# Patient Record
Sex: FEMALE | Race: BLACK OR AFRICAN AMERICAN
[De-identification: names, ages, dates, MRNs, and addresses within clinical notes are randomized per-mention and may not be internally consistent; named-entity substitution may affect disease eponyms.]

---

## 2017-02-23 ENCOUNTER — HOSPITAL ENCOUNTER (OUTPATIENT)
Dept: HOSPITAL 18 - NAVSJIPCSP | Age: 72
Discharge: HOME | End: 2017-02-23
Attending: INTERNAL MEDICINE
Payer: MEDICARE

## 2017-02-23 DIAGNOSIS — Z79.899: ICD-10-CM

## 2017-02-23 DIAGNOSIS — E78.5: Primary | ICD-10-CM

## 2017-02-23 DIAGNOSIS — E11.9: ICD-10-CM

## 2017-02-23 LAB — LDLC SERPL CALC-MCNC: 58 MG/DL

## 2017-02-23 PROCEDURE — 80061 LIPID PANEL: CPT

## 2017-02-23 PROCEDURE — 36415 COLL VENOUS BLD VENIPUNCTURE: CPT

## 2017-02-23 PROCEDURE — 83036 HEMOGLOBIN GLYCOSYLATED A1C: CPT

## 2017-05-25 ENCOUNTER — HOSPITAL ENCOUNTER (OUTPATIENT)
Dept: HOSPITAL 18 - NAVSJIPCSP | Age: 72
Discharge: HOME | End: 2017-05-25
Attending: INTERNAL MEDICINE
Payer: MEDICARE

## 2017-05-25 DIAGNOSIS — Z72.89: ICD-10-CM

## 2017-05-25 DIAGNOSIS — Z79.899: ICD-10-CM

## 2017-05-25 DIAGNOSIS — E11.9: Primary | ICD-10-CM

## 2017-05-25 LAB
CHD RISK SERPL-RTO: 2.2 (ref ?–4.5)
CHOLEST SERPL-MCNC: 139 MG/DL
HDLC SERPL-MCNC: 64 MG/DL
HEP C INDEX: 0.09 S/CO (ref 0–0.79)
LDLC SERPL CALC-MCNC: 66 MG/DL
TRIGL SERPL-MCNC: 45 MG/DL (ref ?–150)

## 2017-05-25 PROCEDURE — 83036 HEMOGLOBIN GLYCOSYLATED A1C: CPT

## 2017-05-25 PROCEDURE — 86803 HEPATITIS C AB TEST: CPT

## 2017-05-25 PROCEDURE — 80061 LIPID PANEL: CPT

## 2017-05-25 PROCEDURE — 36415 COLL VENOUS BLD VENIPUNCTURE: CPT

## 2017-06-13 ENCOUNTER — HOSPITAL ENCOUNTER (OUTPATIENT)
Dept: HOSPITAL 18 - NAV RAD | Age: 72
Discharge: HOME | End: 2017-06-13
Attending: INTERNAL MEDICINE
Payer: MEDICARE

## 2017-06-13 DIAGNOSIS — M25.552: Primary | ICD-10-CM

## 2017-06-13 DIAGNOSIS — M16.12: ICD-10-CM

## 2017-06-13 NOTE — RAD
TWO VIEWS LEFT HIP

6/13/2017

 

HISTORY:

Left hip pain radiating down the left leg for 2 weeks.

 

FINDINGS:

There is minimal left hip osteoarthritis.  No fracture or dislocation is seen.  There are prominent 
calcifications overlying the pelvis which could be related to calcified uterine fibroids, but this c
annot be further evaluated on this exam.

 

IMPRESSION:   

1. Large area of calcifications overlying the pelvis of which the exact etiology is uncertain, but t
his may be related to calcified uterine fibroids.  However, confirmation with CT scan examination ma
y be helpful.

 

2. Mild left hip osteoarthritis.

 

POS: AKIN

## 2017-09-05 ENCOUNTER — HOSPITAL ENCOUNTER (OUTPATIENT)
Dept: HOSPITAL 18 - NAVSJIPCSP | Age: 72
Discharge: HOME | End: 2017-09-05
Attending: INTERNAL MEDICINE
Payer: MEDICARE

## 2017-09-05 DIAGNOSIS — I11.9: ICD-10-CM

## 2017-09-05 DIAGNOSIS — Z79.899: ICD-10-CM

## 2017-09-05 DIAGNOSIS — E78.5: ICD-10-CM

## 2017-09-05 DIAGNOSIS — E11.9: Primary | ICD-10-CM

## 2017-09-05 DIAGNOSIS — R30.0: ICD-10-CM

## 2017-09-05 LAB
ALBUMIN SERPL BCG-MCNC: 3.8 G/DL (ref 3.4–4.8)
ALP SERPL-CCNC: 105 U/L (ref 40–150)
ALT SERPL W P-5'-P-CCNC: 10 U/L (ref 8–55)
ANION GAP SERPL CALC-SCNC: 16 MMOL/L (ref 10–20)
AST SERPL-CCNC: 15 U/L (ref 5–34)
BACTERIA UR QL AUTO: (no result) HPF
BASOPHILS # BLD AUTO: 0.1 THOU/UL (ref 0–0.2)
BASOPHILS NFR BLD AUTO: 0.9 % (ref 0–1)
BILIRUB SERPL-MCNC: 0.4 MG/DL (ref 0.2–1.2)
BUN SERPL-MCNC: 18 MG/DL (ref 9.8–20.1)
CALCIUM SERPL-MCNC: 10.1 MG/DL (ref 7.8–10.44)
CHD RISK SERPL-RTO: 2.3 (ref ?–4.5)
CHLORIDE SERPL-SCNC: 103 MMOL/L (ref 98–107)
CHOLEST SERPL-MCNC: 146 MG/DL
CO2 SERPL-SCNC: 26 MMOL/L (ref 23–31)
CREAT CL PREDICTED SERPL C-G-VRATE: 0 ML/MIN (ref 70–130)
EOSINOPHIL # BLD AUTO: 0.4 THOU/UL (ref 0–0.7)
EOSINOPHIL NFR BLD AUTO: 5.9 % (ref 0–10)
GLOBULIN SER CALC-MCNC: 2.9 G/DL (ref 2.4–3.5)
GLUCOSE SERPL-MCNC: 101 MG/DL (ref 83–110)
HDLC SERPL-MCNC: 64 MG/DL
HGB BLD-MCNC: 11.8 G/DL (ref 12–16)
LDLC SERPL CALC-MCNC: 72 MG/DL
LYMPHOCYTES # BLD AUTO: 2.1 THOU/UL (ref 1.2–3.4)
LYMPHOCYTES NFR BLD AUTO: 32.2 % (ref 21–51)
MCH RBC QN AUTO: 28.7 PG (ref 27–31)
MCV RBC AUTO: 90.2 FL (ref 81–99)
MONOCYTES # BLD AUTO: 0.5 THOU/UL (ref 0.11–0.59)
MONOCYTES NFR BLD AUTO: 7.1 % (ref 0–10)
NEUTROPHILS # BLD AUTO: 3.4 THOU/UL (ref 1.4–6.5)
NEUTROPHILS NFR BLD AUTO: 53.9 % (ref 42–75)
PLATELET # BLD AUTO: 298 THOU/UL (ref 130–400)
POTASSIUM SERPL-SCNC: 4 MMOL/L (ref 3.5–5.1)
RBC # BLD AUTO: 4.11 MILL/UL (ref 4.2–5.4)
RBC UR QL AUTO: (no result) HPF (ref 0–3)
SODIUM SERPL-SCNC: 141 MMOL/L (ref 136–145)
SP GR UR STRIP: 1.01 (ref 1–1.03)
TRIGL SERPL-MCNC: 50 MG/DL (ref ?–150)
WBC # BLD AUTO: 6.4 THOU/UL (ref 4.8–10.8)
WBC UR QL AUTO: (no result) HPF (ref 0–3)

## 2017-09-05 PROCEDURE — 80061 LIPID PANEL: CPT

## 2017-09-05 PROCEDURE — 87086 URINE CULTURE/COLONY COUNT: CPT

## 2017-09-05 PROCEDURE — 81015 MICROSCOPIC EXAM OF URINE: CPT

## 2017-09-05 PROCEDURE — 85025 COMPLETE CBC W/AUTO DIFF WBC: CPT

## 2017-09-05 PROCEDURE — 36415 COLL VENOUS BLD VENIPUNCTURE: CPT

## 2017-09-05 PROCEDURE — 87077 CULTURE AEROBIC IDENTIFY: CPT

## 2017-09-05 PROCEDURE — 83036 HEMOGLOBIN GLYCOSYLATED A1C: CPT

## 2017-09-05 PROCEDURE — 82043 UR ALBUMIN QUANTITATIVE: CPT

## 2017-09-05 PROCEDURE — 81003 URINALYSIS AUTO W/O SCOPE: CPT

## 2017-09-05 PROCEDURE — 87186 SC STD MICRODIL/AGAR DIL: CPT

## 2017-09-05 PROCEDURE — 80053 COMPREHEN METABOLIC PANEL: CPT

## 2018-06-21 ENCOUNTER — HOSPITAL ENCOUNTER (OUTPATIENT)
Dept: HOSPITAL 18 - NAV RAD | Age: 73
Discharge: HOME | End: 2018-06-21
Attending: INTERNAL MEDICINE
Payer: MEDICARE

## 2018-06-21 DIAGNOSIS — M17.12: Primary | ICD-10-CM

## 2018-06-21 NOTE — RAD
LEFT KNEE 4 VIEWS:

 

HISTORY:  

Left knee pain.

 

FINDINGS: 

There are degenerative changes in the left knee manifested by osteophyte formation and joint space na
rrowing.  No fracture, dislocation, or bony destruction is identified.

 

IMPRESSION: 

Left knee osteoarthritis.

 

POS: AKIN

## 2019-02-14 ENCOUNTER — HOSPITAL ENCOUNTER (OUTPATIENT)
Dept: HOSPITAL 92 - LABBT | Age: 74
Discharge: HOME | End: 2019-02-14
Attending: ORTHOPAEDIC SURGERY
Payer: MEDICARE

## 2019-02-14 DIAGNOSIS — Z01.818: Primary | ICD-10-CM

## 2019-02-14 DIAGNOSIS — M17.12: ICD-10-CM

## 2019-02-14 LAB
ANION GAP SERPL CALC-SCNC: 16 MMOL/L (ref 10–20)
BUN SERPL-MCNC: 19 MG/DL (ref 9.8–20.1)
CALCIUM SERPL-MCNC: 11.1 MG/DL (ref 7.8–10.44)
CHLORIDE SERPL-SCNC: 103 MMOL/L (ref 98–107)
CO2 SERPL-SCNC: 24 MMOL/L (ref 23–31)
CREAT CL PREDICTED SERPL C-G-VRATE: 0 ML/MIN (ref 70–130)
CRYSTAL-AUWI FLAG: 0.2 (ref 0–15)
GLUCOSE SERPL-MCNC: 83 MG/DL (ref 83–110)
HEV IGM SER QL: 0.7 (ref 0–7.99)
HGB BLD-MCNC: 13.2 G/DL (ref 12–16)
HYALINE CASTS #/AREA URNS LPF: (no result) LPF
INR PPP: 1.1
MCH RBC QN AUTO: 29.7 PG (ref 27–31)
MCV RBC AUTO: 93.2 FL (ref 78–98)
PATHC CAST-AUWI FLAG: 0 (ref 0–2.49)
PLATELET # BLD AUTO: 317 THOU/UL (ref 130–400)
POTASSIUM SERPL-SCNC: 3.9 MMOL/L (ref 3.5–5.1)
PROTHROMBIN TIME: 13.8 SEC (ref 12–14.7)
RBC # BLD AUTO: 4.44 MILL/UL (ref 4.2–5.4)
RBC UR QL AUTO: (no result) HPF (ref 0–3)
SODIUM SERPL-SCNC: 139 MMOL/L (ref 136–145)
SP GR UR STRIP: 1.01 (ref 1–1.04)
SPERM-AUWI FLAG: 0 (ref 0–9.9)
WBC # BLD AUTO: 7.2 THOU/UL (ref 4.8–10.8)
WBC UR QL AUTO: (no result) HPF (ref 0–3)
YEAST-AUWI FLAG: 0 (ref 0–25)

## 2019-02-14 PROCEDURE — 93005 ELECTROCARDIOGRAM TRACING: CPT

## 2019-02-14 PROCEDURE — 87081 CULTURE SCREEN ONLY: CPT

## 2019-02-14 PROCEDURE — 85027 COMPLETE CBC AUTOMATED: CPT

## 2019-02-14 PROCEDURE — 93010 ELECTROCARDIOGRAM REPORT: CPT

## 2019-02-14 PROCEDURE — 86901 BLOOD TYPING SEROLOGIC RH(D): CPT

## 2019-02-14 PROCEDURE — 86900 BLOOD TYPING SEROLOGIC ABO: CPT

## 2019-02-14 PROCEDURE — 85610 PROTHROMBIN TIME: CPT

## 2019-02-14 PROCEDURE — 81001 URINALYSIS AUTO W/SCOPE: CPT

## 2019-02-14 PROCEDURE — 80048 BASIC METABOLIC PNL TOTAL CA: CPT

## 2019-02-14 PROCEDURE — 86850 RBC ANTIBODY SCREEN: CPT

## 2019-02-19 ENCOUNTER — HOSPITAL ENCOUNTER (INPATIENT)
Dept: HOSPITAL 92 - SDC | Age: 74
LOS: 3 days | Discharge: NURSING, REHAB OR LONG-TERM CARE | DRG: 470 | End: 2019-02-22
Attending: ORTHOPAEDIC SURGERY | Admitting: ORTHOPAEDIC SURGERY
Payer: MEDICARE

## 2019-02-19 VITALS — BODY MASS INDEX: 31.8 KG/M2

## 2019-02-19 DIAGNOSIS — E11.22: ICD-10-CM

## 2019-02-19 DIAGNOSIS — E66.9: ICD-10-CM

## 2019-02-19 DIAGNOSIS — I12.9: ICD-10-CM

## 2019-02-19 DIAGNOSIS — E78.5: ICD-10-CM

## 2019-02-19 DIAGNOSIS — N18.3: ICD-10-CM

## 2019-02-19 DIAGNOSIS — M17.12: Primary | ICD-10-CM

## 2019-02-19 PROCEDURE — 36415 COLL VENOUS BLD VENIPUNCTURE: CPT

## 2019-02-19 PROCEDURE — 36416 COLLJ CAPILLARY BLOOD SPEC: CPT

## 2019-02-19 PROCEDURE — 85027 COMPLETE CBC AUTOMATED: CPT

## 2019-02-19 PROCEDURE — 0SRD0J9 REPLACEMENT OF LEFT KNEE JOINT WITH SYNTHETIC SUBSTITUTE, CEMENTED, OPEN APPROACH: ICD-10-PCS | Performed by: ORTHOPAEDIC SURGERY

## 2019-02-19 PROCEDURE — C1713 ANCHOR/SCREW BN/BN,TIS/BN: HCPCS

## 2019-02-19 PROCEDURE — C1776 JOINT DEVICE (IMPLANTABLE): HCPCS

## 2019-02-19 RX ADMIN — CEFAZOLIN SODIUM SCH MLS: 2 SOLUTION INTRAVENOUS at 13:57

## 2019-02-19 RX ADMIN — ASPIRIN SCH MG: 81 TABLET ORAL at 19:47

## 2019-02-19 RX ADMIN — CEFAZOLIN SODIUM SCH MLS: 2 SOLUTION INTRAVENOUS at 22:24

## 2019-02-19 NOTE — RAD
LEFT KNEE TWO VIEWS:

 

HISTORY:

Postop total knee.

 

COMPARISON:

Knee radiograph from 06/21/2018.

 

FINDINGS:

Satisfactory postop appearance, left total knee arthroplasty and patella resurfacing.  Expected posto
perative gas and edema.

 

IMPRESSION:

Satisfactory postoperative appearance.

 

POS: [facility]

## 2019-02-20 LAB
HGB BLD-MCNC: 11.8 G/DL (ref 12–16)
MCH RBC QN AUTO: 29.7 PG (ref 27–31)
MCV RBC AUTO: 92.9 FL (ref 78–98)
PLATELET # BLD AUTO: 280 THOU/UL (ref 130–400)
RBC # BLD AUTO: 3.96 MILL/UL (ref 4.2–5.4)
WBC # BLD AUTO: 12 THOU/UL (ref 4.8–10.8)

## 2019-02-20 RX ADMIN — ROPIVACAINE HYDROCHLORIDE SCH MLS: 2 INJECTION, SOLUTION EPIDURAL; INFILTRATION at 10:18

## 2019-02-20 RX ADMIN — DOCUSATE SODIUM 50 MG AND SENNOSIDES 8.6 MG SCH TAB: 8.6; 5 TABLET, FILM COATED ORAL at 10:09

## 2019-02-20 RX ADMIN — DOCUSATE SODIUM 50 MG AND SENNOSIDES 8.6 MG SCH TAB: 8.6; 5 TABLET, FILM COATED ORAL at 22:07

## 2019-02-20 RX ADMIN — AMLODIPINE BESYLATE AND BENAZEPRIL HYDROCHLORIDE SCH CAP: 5; 20 CAPSULE ORAL at 21:34

## 2019-02-20 RX ADMIN — ASPIRIN SCH MG: 81 TABLET ORAL at 11:14

## 2019-02-20 RX ADMIN — MULTIPLE VITAMINS W/ MINERALS TAB SCH TAB: TAB at 10:10

## 2019-02-20 RX ADMIN — ASPIRIN SCH MG: 81 TABLET ORAL at 21:35

## 2019-02-20 NOTE — PDOC.PN
- Subjective


Encounter Start Date: 02/20/19


Encounter Start Time: 18:30





Patient seen and examined for med mngt. Pain better controlled with PCA. No CP/

SOB. No new complaints. No overnight events





- Objective


MAR Reviewed: Yes


Vital Signs & Weight: 


 Vital Signs (12 hours)











  Temp Pulse Resp BP BP Pulse Ox


 


 02/20/19 21:35   102 H   121/70  


 


 02/20/19 20:00  98.8 F  101 H  20   121/70  94 L


 


 02/20/19 16:18  98.1 F  92  18   103/65  93 L


 


 02/20/19 14:43     145/81 H  


 


 02/20/19 12:25  97.6 F  86  18   104/64  96


 


 02/20/19 10:12   89   162/78 H  


 


 02/20/19 10:10   89   162/78 H  








 Weight











Admit Weight                   197 lb


 


Weight                         197 lb














I&O: 


 











 02/19/19 02/20/19 02/21/19





 06:59 06:59 06:59


 


Intake Total  885 1800


 


Output Total  750 


 


Balance  135 1800











Result Diagrams: 


 02/21/19 05:20





Additional Labs: 


 Accuchecks











  02/20/19 02/20/19 02/20/19





  16:16 11:16 05:36


 


POC Glucose  107  130 H  121 H














  02/19/19





  21:12


 


POC Glucose  131 H














Phys Exam





- Physical Examination


Constitutional: NAD


Respiratory: no wheezing, no rhonchi


Cardiovascular: RRR, no rub


Gastrointestinal: soft, non-tender, positive bowel sounds


Musculoskeletal: no edema





Dx/Plan





- Plan


DVT proph w/SCDs





1. HTN - better controlled


2. HLD


3. DM2


4. CKD3


5. Obesity BMI 31.8





PLAN:


Cont Clonidine


Will hold Hydralazine, Coreg and Lotrel if SBP <130-140


Cont low dose sliding scale


Cont to monitor











Review of Systems





- Review of Systems


Cardiovascular: negative: chest pain, palpitations, orthopnea, paroxysmal 

nocturnal dyspnea, edema, light headedness, other


Gastrointestinal: negative: Nausea, Vomiting, Abdominal Pain, Diarrhea, 

Constipation, Melena, Hematochezia, Other





- Medications/Allergies


Allergies/Adverse Reactions: 


 Allergies











Allergy/AdvReac Type Severity Reaction Status Date / Time


 


codeine Allergy   Verified 02/14/19 12:48











Medications: 


 Current Medications





Acetaminophen (Tylenol)  650 mg PO Q4H PRN


   PRN Reason: Headache/Fever or Pain


   Last Admin: 02/19/19 19:49 Dose:  650 mg


Amlodipine/Benazepril HCl (Lotrel 5/20)  1 cap PO BID [facility]


   Last Admin: 02/20/19 21:34 Dose:  1 cap


Aspirin (Ecotrin)  81 mg PO BID [facility]


   Last Admin: 02/20/19 21:35 Dose:  81 mg


Atorvastatin Calcium (Lipitor)  10 mg PO HS [facility]


   Last Admin: 02/20/19 21:34 Dose:  10 mg


Carvedilol (Coreg)  6.25 mg PO BID [facility]


   Last Admin: 02/20/19 21:35 Dose:  6.25 mg


Clonidine (Catapres)  0.2 mg PO TID [facility]


   Last Admin: 02/20/19 21:35 Dose:  0.2 mg


Dextrose/Water (Dextrose 50%)  25 gm SLOW IVP PRN PRN


   PRN Reason: Hypoglycemia


Diphenhydramine HCl (Benadryl)  25 mg IVP Q3H PRN


   PRN Reason: Itching


Diphenhydramine HCl (Benadryl)  25 mg PO Q3H PRN


   PRN Reason: Itching


Diphenhydramine HCl (Benadryl)  25 mg IM Q3H PRN


   PRN Reason: Itching


Ferrous Gluconate (Fergon)  324 mg PO BID-[facility]


   Last Admin: 02/20/19 16:54 Dose:  324 mg


Glucagon (Glucagon)  1 mg IM PRN PRN


   PRN Reason: Hypoglycemia


Hydralazine HCl (Apresoline)  10 mg SLOW IVP Q4H PRN


   PRN Reason: SBP Greater Than 180


Hydralazine HCl (Apresoline)  25 mg PO BID [facility]


   Last Admin: 02/20/19 21:35 Dose:  25 mg


Hydrochlorothiazide (Hydrochlorothiazide)  25 mg PO DAILY [facility]


Ropivacaine 250 ml/ Device  250 mls @ 0 mls/hr NERVE BLCK INF [facility]


   Last Admin: 02/20/19 10:18 Dose:  250 mls


Sodium Chloride (Normal Saline 0.9%)  1,000 mls @ 100 mls/hr IV .Q10H [facility]


   Last Admin: 02/20/19 14:28 Dose:  Not Given


Fentanyl Citrate 2,000 mcg/ (Sodium Chloride)  100 mls @ 0 mls/hr IV INF PRN


   PRN Reason: Pain


   Last Admin: 02/19/19 21:45 Dose:  100 mls


Dextrose/Water (D5w)  1,000 mls @ 0 mls/hr IV .Q0M PRN


   PRN Reason: Hypoglycemia


Iron/Minerals/Multivitamins (Theragran M)  1 tab PO DAILY [facility]


   Last Admin: 02/20/19 10:10 Dose:  1 tab


Ketorolac Tromethamine (Toradol)  15 mg IVP Q6H PRN


   PRN Reason: Moderate Pain (4-6)


   Stop: 02/22/19 21:19


   Last Admin: 02/20/19 10:08 Dose:  15 mg


Loratadine (Claritin)  10 mg PO DAILY [facility]


   Last Admin: 02/20/19 10:10 Dose:  10 mg


Metformin HCl (Glucophage)  500 mg PO QA-[facility]


   Last Admin: 02/20/19 10:09 Dose:  500 mg


Naloxone HCl (Narcan)  0.2 mg IV Q5MIN PRN


   PRN Reason: Opiate Reversal


Ondansetron HCl (Zofran)  4 mg IVP Q6H PRN


   PRN Reason: Nausea/Vomiting


Promethazine HCl (Phenergan)  12.5 mg IM Q4H PRN


   PRN Reason: Nausea/Vomiting


Senna/Docusate Sodium (Senokot S)  2 tab PO BID [facility]


   Last Admin: 02/20/19 10:09 Dose:  2 tab


Sodium Chloride (Flush - Normal Saline)  10 ml IVF PRN PRN


   PRN Reason: Saline Flush


Zolpidem Tartrate (Ambien)  5 mg PO HSPRN PRN


   PRN Reason: Insomnia

## 2019-02-21 LAB
HGB BLD-MCNC: 10.4 G/DL (ref 12–16)
MCH RBC QN AUTO: 30.1 PG (ref 27–31)
MCV RBC AUTO: 93.8 FL (ref 78–98)
PLATELET # BLD AUTO: 218 THOU/UL (ref 130–400)
RBC # BLD AUTO: 3.45 MILL/UL (ref 4.2–5.4)
WBC # BLD AUTO: 9.3 THOU/UL (ref 4.8–10.8)

## 2019-02-21 RX ADMIN — AMLODIPINE BESYLATE AND BENAZEPRIL HYDROCHLORIDE SCH: 5; 20 CAPSULE ORAL at 21:00

## 2019-02-21 RX ADMIN — DOCUSATE SODIUM 50 MG AND SENNOSIDES 8.6 MG SCH TAB: 8.6; 5 TABLET, FILM COATED ORAL at 09:06

## 2019-02-21 RX ADMIN — MULTIPLE VITAMINS W/ MINERALS TAB SCH TAB: TAB at 09:06

## 2019-02-21 RX ADMIN — ASPIRIN SCH MG: 81 TABLET ORAL at 21:01

## 2019-02-21 RX ADMIN — ASPIRIN SCH MG: 81 TABLET ORAL at 09:06

## 2019-02-21 RX ADMIN — DOCUSATE SODIUM 50 MG AND SENNOSIDES 8.6 MG SCH TAB: 8.6; 5 TABLET, FILM COATED ORAL at 21:00

## 2019-02-21 RX ADMIN — HYDROCODONE BITARTRATE AND ACETAMINOPHEN PRN TAB: 5; 325 TABLET ORAL at 21:05

## 2019-02-21 RX ADMIN — AMLODIPINE BESYLATE AND BENAZEPRIL HYDROCHLORIDE SCH: 5; 20 CAPSULE ORAL at 10:19

## 2019-02-21 RX ADMIN — HYDROCODONE BITARTRATE AND ACETAMINOPHEN PRN TAB: 5; 325 TABLET ORAL at 11:29

## 2019-02-21 RX ADMIN — HYDROCODONE BITARTRATE AND ACETAMINOPHEN PRN TAB: 5; 325 TABLET ORAL at 15:33

## 2019-02-21 RX ADMIN — ROPIVACAINE HYDROCHLORIDE SCH MLS: 2 INJECTION, SOLUTION EPIDURAL; INFILTRATION at 12:21

## 2019-02-21 NOTE — PDOC.PN
- Subjective


Encounter Start Date: 02/21/19


Encounter Start Time: 10:30





Patient seen and examined for med mngt. Pain controlled. No CP/SOB/

Palpitations. No new complaints. No overnight events





- Objective


MAR Reviewed: Yes


Vital Signs & Weight: 


 Vital Signs (12 hours)











  Temp Pulse Resp BP BP Pulse Ox


 


 02/21/19 17:05   92    112/70 


 


 02/21/19 15:15  98.4 F  109 H  16   147/59 H  97


 


 02/21/19 15:02     148/78 H  


 


 02/21/19 11:58  98.4 F  101 H  18   149/80 H  97


 


 02/21/19 08:39       95


 


 02/21/19 07:30  98.8 F  88  16   109/65  95








 Weight











Admit Weight                   197 lb


 


Weight                         197 lb














I&O: 


 











 02/20/19 02/21/19 02/22/19





 06:59 06:59 06:59


 


Intake Total 885 1800 


 


Output Total 750  


 


Balance 135 1800 











Result Diagrams: 


 02/21/19 05:20








Phys Exam





- Physical Examination


Constitutional: NAD


Respiratory: no wheezing, no rhonchi


Cardiovascular: RRR, no rub


Gastrointestinal: soft, non-tender, positive bowel sounds


Musculoskeletal: no edema





Dx/Plan





- Plan


DVT proph w/SCDs





1. HTN


2. HLD


3. DM2


4. CKD3


5. Obesity BMI 31.8





PLAN:


Cont Clonidine, Hydralazine, Coreg and Lotrel with holding parameters


dc sliding scale


Cont to monitor


Cont Metformin








Review of Systems





- Review of Systems


Respiratory: negative: Cough, Dry, Shortness of Breath, Hemoptysis, SOB with 

Excertion, Pleuritic Pain, Sputum, Wheezing


Cardiovascular: negative: chest pain, palpitations, orthopnea, paroxysmal 

nocturnal dyspnea, edema, light headedness, other





- Medications/Allergies


Allergies/Adverse Reactions: 


 Allergies











Allergy/AdvReac Type Severity Reaction Status Date / Time


 


codeine Allergy   Verified 02/14/19 12:48











Medications: 


 Current Medications





Acetaminophen (Tylenol)  650 mg PO Q4H PRN


   PRN Reason: Headache/Fever or Pain


   Last Admin: 02/21/19 03:56 Dose:  650 mg


Hydrocodone Bitart/Acetaminophen (Norco 5/325)  1 tab PO Q4H PRN


   PRN Reason: Mild Pain (1-3)


Hydrocodone Bitart/Acetaminophen (Norco 5/325)  2 tab PO Q4H PRN


   PRN Reason: Mild-Moderate Pain (1-5)


   Last Admin: 02/21/19 15:33 Dose:  2 tab


Amlodipine/Benazepril HCl (Lotrel 5/20)  1 cap PO BID [facility]


   Last Admin: 02/21/19 10:19 Dose:  Not Given


Aspirin (Ecotrin)  81 mg PO BID [facility]


   Last Admin: 02/21/19 09:06 Dose:  81 mg


Atorvastatin Calcium (Lipitor)  10 mg PO HS [facility]


   Last Admin: 02/20/19 21:34 Dose:  10 mg


Carvedilol (Coreg)  6.25 mg PO BID [facility]


   Last Admin: 02/21/19 10:19 Dose:  Not Given


Clonidine (Catapres)  0.2 mg PO TID [facility]


   Last Admin: 02/21/19 15:02 Dose:  0.2 mg


Dextrose/Water (Dextrose 50%)  25 gm SLOW IVP PRN PRN


   PRN Reason: Hypoglycemia


Diphenhydramine HCl (Benadryl)  25 mg IVP Q3H PRN


   PRN Reason: Itching


Diphenhydramine HCl (Benadryl)  25 mg PO Q3H PRN


   PRN Reason: Itching


Diphenhydramine HCl (Benadryl)  25 mg IM Q3H PRN


   PRN Reason: Itching


Ferrous Gluconate (Fergon)  324 mg PO BID-[facility]


   Last Admin: 02/21/19 17:05 Dose:  324 mg


Glucagon (Glucagon)  1 mg IM PRN PRN


   PRN Reason: Hypoglycemia


Hydralazine HCl (Apresoline)  10 mg SLOW IVP Q4H PRN


   PRN Reason: SBP Greater Than 180


Hydralazine HCl (Apresoline)  25 mg PO BID [facility]


   Last Admin: 02/21/19 10:20 Dose:  Not Given


Hydrochlorothiazide (Hydrochlorothiazide)  25 mg PO DAILY [facility]


   Last Admin: 02/21/19 10:20 Dose:  Not Given


Ropivacaine 250 ml/ Device  250 mls @ 0 mls/hr NERVE BLCK INF [facility]


   Last Admin: 02/21/19 12:21 Dose:  250 mls


Dextrose/Water (D5w)  1,000 mls @ 0 mls/hr IV .Q0M PRN


   PRN Reason: Hypoglycemia


Iron/Minerals/Multivitamins (Theragran M)  1 tab PO DAILY [facility]


   Last Admin: 02/21/19 09:06 Dose:  1 tab


Ketorolac Tromethamine (Toradol)  15 mg IVP Q6H PRN


   PRN Reason: Moderate Pain (4-6)


   Stop: 02/22/19 21:19


   Last Admin: 02/20/19 10:08 Dose:  15 mg


Loratadine (Claritin)  10 mg PO DAILY [facility]


   Last Admin: 02/21/19 09:06 Dose:  10 mg


Metformin HCl (Glucophage)  500 mg PO QA-[facility]


   Last Admin: 02/21/19 09:06 Dose:  500 mg


Naloxone HCl (Narcan)  0.2 mg IV Q5MIN PRN


   PRN Reason: Opiate Reversal


Ondansetron HCl (Zofran)  4 mg IVP Q6H PRN


   PRN Reason: Nausea/Vomiting


Promethazine HCl (Phenergan)  12.5 mg IM Q4H PRN


   PRN Reason: Nausea/Vomiting


Senna/Docusate Sodium (Senokot S)  2 tab PO BID [facility]


   Last Admin: 02/21/19 09:06 Dose:  2 tab


Sodium Chloride (Flush - Normal Saline)  10 ml IVF PRN PRN


   PRN Reason: Saline Flush


Zolpidem Tartrate (Ambien)  5 mg PO HSPRN PRN


   PRN Reason: Insomnia

## 2019-02-22 ENCOUNTER — HOSPITAL ENCOUNTER (INPATIENT)
Dept: HOSPITAL 18 - NAV ACUTE | Age: 74
LOS: 14 days | Discharge: HOME HEALTH | DRG: 561 | End: 2019-03-08
Attending: INTERNAL MEDICINE | Admitting: INTERNAL MEDICINE
Payer: MEDICARE

## 2019-02-22 VITALS — SYSTOLIC BLOOD PRESSURE: 145 MMHG | DIASTOLIC BLOOD PRESSURE: 79 MMHG | TEMPERATURE: 100 F

## 2019-02-22 VITALS — BODY MASS INDEX: 32.3 KG/M2

## 2019-02-22 DIAGNOSIS — K59.00: ICD-10-CM

## 2019-02-22 DIAGNOSIS — M48.061: ICD-10-CM

## 2019-02-22 DIAGNOSIS — E78.5: ICD-10-CM

## 2019-02-22 DIAGNOSIS — E11.22: ICD-10-CM

## 2019-02-22 DIAGNOSIS — J30.9: ICD-10-CM

## 2019-02-22 DIAGNOSIS — Z79.84: ICD-10-CM

## 2019-02-22 DIAGNOSIS — N18.3: ICD-10-CM

## 2019-02-22 DIAGNOSIS — I12.9: ICD-10-CM

## 2019-02-22 DIAGNOSIS — Z88.8: ICD-10-CM

## 2019-02-22 DIAGNOSIS — Z47.1: Primary | ICD-10-CM

## 2019-02-22 DIAGNOSIS — Z96.652: ICD-10-CM

## 2019-02-22 DIAGNOSIS — E66.9: ICD-10-CM

## 2019-02-22 DIAGNOSIS — R53.81: ICD-10-CM

## 2019-02-22 DIAGNOSIS — G89.29: ICD-10-CM

## 2019-02-22 DIAGNOSIS — Z98.51: ICD-10-CM

## 2019-02-22 LAB
HGB BLD-MCNC: 11.1 G/DL (ref 12–16)
MCH RBC QN AUTO: 30.5 PG (ref 27–31)
MCV RBC AUTO: 93.7 FL (ref 78–98)
PLATELET # BLD AUTO: 282 THOU/UL (ref 130–400)
RBC # BLD AUTO: 3.62 MILL/UL (ref 4.2–5.4)
WBC # BLD AUTO: 10.3 THOU/UL (ref 4.8–10.8)

## 2019-02-22 PROCEDURE — 36416 COLLJ CAPILLARY BLOOD SPEC: CPT

## 2019-02-22 PROCEDURE — 80048 BASIC METABOLIC PNL TOTAL CA: CPT

## 2019-02-22 PROCEDURE — 85025 COMPLETE CBC W/AUTO DIFF WBC: CPT

## 2019-02-22 RX ADMIN — DOCUSATE SODIUM 50 MG AND SENNOSIDES 8.6 MG SCH TAB: 8.6; 5 TABLET, FILM COATED ORAL at 09:02

## 2019-02-22 RX ADMIN — AMLODIPINE BESYLATE AND BENAZEPRIL HYDROCHLORIDE SCH CAP: 5; 20 CAPSULE ORAL at 09:02

## 2019-02-22 RX ADMIN — MULTIPLE VITAMINS W/ MINERALS TAB SCH TAB: TAB at 09:02

## 2019-02-22 RX ADMIN — ASPIRIN SCH MG: 81 TABLET ORAL at 09:02

## 2019-02-23 LAB
ANION GAP SERPL CALC-SCNC: 11 MMOL/L (ref 10–20)
BASOPHILS # BLD AUTO: 0.2 THOU/UL (ref 0–0.2)
BASOPHILS NFR BLD AUTO: 1.6 % (ref 0–1)
BUN SERPL-MCNC: 13 MG/DL (ref 9.8–20.1)
CALCIUM SERPL-MCNC: 9.8 MG/DL (ref 7.8–10.44)
CHLORIDE SERPL-SCNC: 105 MMOL/L (ref 98–107)
CO2 SERPL-SCNC: 26 MMOL/L (ref 23–31)
CREAT CL PREDICTED SERPL C-G-VRATE: 78 ML/MIN (ref 70–130)
EOSINOPHIL # BLD AUTO: 0.5 THOU/UL (ref 0–0.7)
EOSINOPHIL NFR BLD AUTO: 4.9 % (ref 0–10)
GLUCOSE SERPL-MCNC: 121 MG/DL (ref 83–110)
HGB BLD-MCNC: 9.3 G/DL (ref 12–16)
LYMPHOCYTES # BLD AUTO: 1.6 THOU/UL (ref 1.2–3.4)
LYMPHOCYTES NFR BLD AUTO: 16.1 % (ref 21–51)
MCH RBC QN AUTO: 29.4 PG (ref 27–31)
MCV RBC AUTO: 90.5 FL (ref 78–98)
MONOCYTES # BLD AUTO: 1.1 THOU/UL (ref 0.11–0.59)
MONOCYTES NFR BLD AUTO: 11.2 % (ref 0–10)
NEUTROPHILS # BLD AUTO: 6.4 THOU/UL (ref 1.4–6.5)
NEUTROPHILS NFR BLD AUTO: 66.3 % (ref 42–75)
PLATELET # BLD AUTO: 314 THOU/UL (ref 130–400)
POTASSIUM SERPL-SCNC: 3.7 MMOL/L (ref 3.5–5.1)
RBC # BLD AUTO: 3.16 MILL/UL (ref 4.2–5.4)
SODIUM SERPL-SCNC: 138 MMOL/L (ref 136–145)
WBC # BLD AUTO: 9.7 THOU/UL (ref 4.8–10.8)

## 2019-02-23 RX ADMIN — ASPIRIN SCH MG: 81 TABLET ORAL at 20:33

## 2019-02-23 NOTE — HP
CHIEF COMPLAINT:  Status post left total knee replacement for therapy.



BRIEF HISTORY:  This is a very pleasant 73-year-old overweight 

female who underwent elective left total knee replacement.  She was seen by

hospitalist for medical management.  She has improved sufficiently that they felt

her to be a candidate for inpatient rehabilitation and transferred her here.  The

patient is resting comfortably in bed and denies any concerns.  Her family is in the

room.  She denies any fever or chills.  She states that the pain medicines are

helping.  No other complaints. 



PAST MEDICAL HISTORY:  

1. Diabetes mellitus, type 2.

2. Hypertension.

3. Dyslipidemia.

4. Chronic kidney disease, stage 3.

5. Obesity with a BMI of 31.8.

6. She also has history of chronic back pain and degenerative joint disease.



PAST SURGICAL HISTORY:  

1. She has had epidural steroid injections in the past.

2. Bilateral tubal ligations.

3. Recent left total knee replacement.



FAMILY HISTORY:  Positive for hypertension in her father and mother.  One sister

 of coronary artery disease and MI. 



PSYCHOSOCIAL HISTORY:  Denies any tobacco, alcohol, or recreational drug use.

Fairly active and independent. 



ALLERGIES:  TO TYLENOL NO. 3.



MEDICATIONS:  She has been transferred here on the following medications:

1. Amlodipine 10 mg p.o. at bedtime.

2. Ecotrin 81 mg p.o. b.i.d.

3. Lipitor 10 mg daily.

4. Lotensin 40 mg daily.

5. Carvedilol 6.25 mg b.i.d.

6. Clonidine 0.2 mg t.i.d.

7. Hydralazine 25 mg daily.

8. Hydrochlorothiazide 25 mg daily.

9. Claritin 10 mg daily.

10. Metformin 500 mg daily.

11. Tramadol 50 mg q.6 p.r.n.



REVIEW OF SYSTEMS:  CARDIOVASCULAR SYSTEMS:  Denies any chest pain, shortness of

breath, palpitations, PND, orthopnea, or pedal edema. 

RESPIRATORY SYSTEM:  Denies any chronic cough, expectoration, or pleuritic type

chest pain. 

GASTROINTESTINAL SYSTEM:  Denies any nausea, vomiting, diarrhea, constipation,

hematemesis, melena, or hematochezia. 

GENITOURINARY SYSTEM:  Denies any frequency, urgency, dysuria, or hematuria. 

CENTRAL NERVOUS SYSTEM:  Denies any focal numbness, weakness, or fainting spells. 

HEENT:  Denies any difficulty with vision, speech, hearing, or swallowing.



PHYSICAL EXAMINATION:

GENERAL:  Very pleasant 73-year-old  female who is resting

comfortably in bed, in no apparent distress.  She responds appropriate to questions.

 She is alert, awake, and oriented x3. 

VITAL SIGNS:  She is afebrile, heart rate 91, respirations 16, oxygen saturation 99%

on room air, and blood pressure was 165/80 this morning.  This is prior to her blood

pressure medications. 

HEENT:  Normocephalic, atraumatic.  Pupils equal, reactive to light and

accommodation.  No JVD, thyromegaly, cervical lymphadenopathy __________ carotid

bruits 

CARDIOVASCULAR SYSTEM:  S1 and S2 plus.  Rate and rhythm regular. 

RESPIRATORY SYSTEM:  Normal vesicular breath sounds heard on all lung fields. 

ABDOMEN:  Soft, obese, nontender.  Bowel sounds heard in all quadrants. 

EXTREMITIES:  Without cyanosis or clubbing.  Trace edema, left leg.  Left knee

incision with dressing.  Localized area of superficial erythema away from the

incision in her medial aspect of her left leg.  No warmth or tenderness. 

CENTRAL NERVOUS SYSTEM:  Alert, awake, and oriented x3.  Cranial nerves 2 through 12

grossly intact.  Motor system examination is grossly nonfocal except for weakness in

the left leg probably due to the need for surgery. 



LABORATORY VALUES:  White count is 9.7, H and H are 9.3 and 28.6; these were 11.8

and 36.8 prior to surgery.  Sodium 138, potassium 3.7, and BUN and creatinine 13 and

0.92.  Blood sugars are 121, 130, 107, 121, and 125. 



IMPRESSION:  

1. Degenerative joint disease, status post left total knee replacement.

2. Obesity with BMI of 31.8.

3. Diabetes mellitus, type 2, well controlled.

4. Hypertension, not well controlled.

5. Dyslipidemia.

6. Allergic rhinitis.

7. History of lumbar spinal stenosis, requiring epidural steroid injections.



PLAN:  

1. Continue current medications.

2. Change aspirin to b.i.d. as per my discussion with the PA for Orthopedic Surgery.

3. 1800-calorie heart healthy ADA diet.

4. Accu-Cheks with sliding scale coverage.

5. Monitor blood pressure and adjust medications as needed.

6. Incision care.

7. Orthopedic precautions.

8. Watch for any signs of infection.

9. Ice pack to left knee.

10. PT and OT eval and treat.

11. Routine laboratory values.

12. Discussed with the patient and family in detail and all questions answered.







Job ID:  [id number]

## 2019-02-24 RX ADMIN — ASPIRIN SCH MG: 81 TABLET ORAL at 10:03

## 2019-02-24 RX ADMIN — ASPIRIN SCH MG: 81 TABLET ORAL at 20:33

## 2019-02-24 NOTE — PRG
DATE OF SERVICE:  02/24/2019



SUBJECTIVE:  Ms. Green is resting comfortably.  Denies any complaints.  Tolerating

therapy.  Medications are helping with her pain.  Denies any chest pain, shortness

of breath, lightheadedness, or dizziness. 



OBJECTIVE:  VITAL SIGNS:  She is afebrile.  Heart rate 89, respirations 20, oxygen

saturation 93%, and blood pressure 148/68. 

CARDIOVASCULAR:  S1 and S2 plus. 

RESPIRATORY:  Normal vesicular breath sounds. 

ABDOMEN:  Soft, obese, nontender.  Bowel sounds in all quadrants. 

EXTREMITIES:  Without cyanosis or clubbing.  Trace edema, left leg.  Left knee

incision with dressing.  Superficial erythema noted yesterday has resolved. 

CENTRAL NERVOUS SYSTEM:  Awake and oriented x3.  Cranial nerves 2 through 12 intact.

 Generalized weakness. 



LABORATORY DATA:  Blood sugars are 141, 205, 127 and 116.



IMPRESSION:  

1. Left total knee replacement.

2. Diabetes mellitus type 2.

3. Hypertension, well controlled.

4. Dyslipidemia.

5. Degenerative joint disease.

6. Obesity.

7. Chronic low back pain.



PLAN:  

1. Continue current medications.

2. Heart healthy diet.

3. Monitor blood pressure and adjust medications as needed.

4. Incision care.

5. Orthopedic precautions.

6. DVT and stress ulcer prophylaxis.

7. Decubitus precautions.

8. Routine laboratory values.

9. Continue physical therapy.

10. Discussed with the patient in detail and all questions answered.







Job ID:  [id number]

## 2019-02-25 RX ADMIN — ASPIRIN SCH MG: 81 TABLET ORAL at 08:28

## 2019-02-25 RX ADMIN — ASPIRIN SCH MG: 81 TABLET ORAL at 19:45

## 2019-02-25 NOTE — PRG
DATE OF SERVICE:  02/25/2019



SUBJECTIVE:  Ms. Green is up in the wheelchair and eating lunch.  Her family is

with her.  She apparently had a good session of therapy this morning.  She denies

any fever or chills.  She does notice some pain in her surgical site. 



OBJECTIVE:  VITAL SIGNS:  She is afebrile, heart rate 86, respirations 18, oxygen

saturation is 96% on room air, and blood pressure is 144/79. 

CARDIOVASCULAR SYSTEM:  S1 and S2 plus. 

RESPIRATORY SYSTEM:  Normal vesicular breath sounds. 

ABDOMEN:  Soft, obese, nontender.  Bowel sounds in all quadrants. 

EXTREMITIES:  Without cyanosis or clubbing.  Trace edema, left leg.  Left knee

incision looks great.  No neurovascular compromise. 

CENTRAL NERVOUS SYSTEM:  A, A, and O x3.  Cranial nerves 2 through 12 intact.

Generalized weakness. 



LABORATORY DATA:  Blood sugars are 127, 116, 118, 152, 118, and 115.



IMPRESSION:  

1. Diabetes mellitus, type 2, well controlled.

2. Hypertension, well controlled.

3. Dyslipidemia.

4. Degenerative joint disease, status post left total knee replacement.

5. Chronic back pain due to spinal stenosis.



PLAN:  

1. Continue current medications.

2. 1800-calorie heart healthy ADA diet.

3. Accu-Cheks with sliding scale coverage.

4. Monitor blood pressure and adjust medications as needed.

5. Incision care.

6. Orthopedic precautions.

7. Routine laboratory values.

8. DVT and stress ulcer prophylaxis.

9. Discussed with the patient and family in detail and all questions answered.







Job ID:  [id number]

## 2019-02-26 RX ADMIN — ASPIRIN SCH MG: 81 TABLET ORAL at 09:21

## 2019-02-26 RX ADMIN — HYDROCODONE BITARTRATE AND ACETAMINOPHEN PRN TAB: 5; 325 TABLET ORAL at 21:21

## 2019-02-26 RX ADMIN — ASPIRIN SCH MG: 81 TABLET ORAL at 21:23

## 2019-02-27 RX ADMIN — HYDROCODONE BITARTRATE AND ACETAMINOPHEN PRN TAB: 5; 325 TABLET ORAL at 15:06

## 2019-02-27 RX ADMIN — ASPIRIN SCH MG: 81 TABLET ORAL at 08:47

## 2019-02-27 RX ADMIN — HYDROCODONE BITARTRATE AND ACETAMINOPHEN PRN TAB: 5; 325 TABLET ORAL at 21:05

## 2019-02-27 RX ADMIN — ASPIRIN SCH MG: 81 TABLET ORAL at 20:50

## 2019-02-27 RX ADMIN — HYDROCODONE BITARTRATE AND ACETAMINOPHEN PRN TAB: 5; 325 TABLET ORAL at 08:47

## 2019-02-27 NOTE — PRG
DATE OF SERVICE:  02/27/2019



SUBJECTIVE:  Ms. Green is doing well.  She did require increase in her pain

medicine to Norco from the tramadol and that really seems to be helping.  She is

tolerating her therapy.  Her leg swelling is much improved.  No redness.  The

patient's niece in the room.  No concerns or questions. 



OBJECTIVE:  VITAL SIGNS:  She is afebrile.  Heart rate 88, respirations 18, oxygen

saturation 100% on room air, blood pressure 129/62. 

CARDIOVASCULAR SYSTEM:  S1-S2 plus. 

RESPIRATORY SYSTEM:  Normal vesicular breath sounds heard in all lung fields. 

ABDOMEN:  Soft, obese, nontender.  Bowel sounds heard in all quadrants. 

EXTREMITIES:  Without cyanosis or clubbing.  Left knee incision is healthy.

Improved edema. 

CENTRAL NERVOUS SYSTEM:  Alert, awake, and oriented x3.  Cranial nerves 2 through 12

intact. 



LABORATORY DATA:  Blood sugars are 112, 166, 133, 140, 123.



IMPRESSION:  

1. Diabetes mellitus type 2, well controlled.

2. Hypertension.

3. Dyslipidemia.

4. Degenerative joint disease, status post left total knee replacement.

5. Lumbar spinal stenosis.

6. Obesity.

7. Improving deconditioning.



PLAN:  

1. Continue current medications.

2. 1800-calorie heart healthy ADA diet.

3. Accu-Cheks with sliding scale coverage.

4. DVT and stress ulcer prophylaxis.

5. Decubitus precautions.

6. Routine laboratory values.

7. Monitor blood pressure and adjust medications as needed.

8. Incision care.

9. Orthopedic precautions.

10. Continue physical therapy.

11. Discussed with the patient, niece, and nursing in detail and all questions

answered. 







Job ID:  [id number]

## 2019-02-28 RX ADMIN — ASPIRIN SCH MG: 81 TABLET ORAL at 21:19

## 2019-02-28 RX ADMIN — ASPIRIN SCH MG: 81 TABLET ORAL at 08:38

## 2019-02-28 RX ADMIN — HYDROCODONE BITARTRATE AND ACETAMINOPHEN PRN TAB: 5; 325 TABLET ORAL at 08:38

## 2019-02-28 RX ADMIN — HYDROCODONE BITARTRATE AND ACETAMINOPHEN PRN TAB: 5; 325 TABLET ORAL at 15:18

## 2019-02-28 NOTE — PRG
DATE OF SERVICE:  



SUBJECTIVE:  Ms. Green is up in her wheelchair.  She denies any complaints.  Pain

medications are helping.  Her family is in the room.  No concerns or questions

discussed with nursing. 



OBJECTIVE:  VITAL SIGNS:  She is afebrile.  Heart rate 89, respirations 20, oxygen

saturation 94% on room air, and blood pressure 132/60. 

CARDIOVASCULAR:  S1 and S2 plus. 

RESPIRATORY:  Normal vesicular breath sounds. 

ABDOMEN:  Soft and nontender.  Bowel sounds heard in all quadrants. 

EXTREMITIES:  Without cyanosis or clubbing.  Trace edema in left leg.  Left knee

incision is healthy. 



LABORATORY DATA:  Blood sugars are 136, 124, 175, and 134.



IMPRESSION:  

1. Diabetes mellitus type 2, well controlled.

2. Hypertension, well controlled.

3. Dyslipidemia.

4. Degenerative joint disease, status post left total knee replacement.

5. Lumbar spinal stenosis.

6. Obesity.

7. Improving deconditioning.



PLAN:  

1. Continue current medications.

2. 1800-calorie heart healthy ADA diet.

3. Accu-Cheks with sliding scale coverage.

4. DVT and stress ulcer prophylaxis.

5. Decubitus precautions.

6. Routine laboratory values.

7. Physical therapy.

8. Discussed with the patient and nursing in detail.  All questions answered.

9. Incision care and orthopedic precautions.







Job ID:  [id number]

## 2019-03-01 RX ADMIN — HYDROCODONE BITARTRATE AND ACETAMINOPHEN PRN TAB: 5; 325 TABLET ORAL at 16:36

## 2019-03-01 RX ADMIN — ASPIRIN SCH MG: 81 TABLET ORAL at 20:37

## 2019-03-01 RX ADMIN — ASPIRIN SCH MG: 81 TABLET ORAL at 08:04

## 2019-03-01 RX ADMIN — HYDROCODONE BITARTRATE AND ACETAMINOPHEN PRN TAB: 5; 325 TABLET ORAL at 08:37

## 2019-03-02 RX ADMIN — ASPIRIN SCH MG: 81 TABLET ORAL at 21:28

## 2019-03-02 RX ADMIN — HYDROCODONE BITARTRATE AND ACETAMINOPHEN PRN TAB: 5; 325 TABLET ORAL at 09:10

## 2019-03-02 RX ADMIN — HYDROCODONE BITARTRATE AND ACETAMINOPHEN PRN TAB: 5; 325 TABLET ORAL at 15:30

## 2019-03-02 RX ADMIN — HYDROCODONE BITARTRATE AND ACETAMINOPHEN PRN TAB: 5; 325 TABLET ORAL at 21:29

## 2019-03-02 RX ADMIN — ASPIRIN SCH MG: 81 TABLET ORAL at 09:12

## 2019-03-02 NOTE — PRG
DATE OF SERVICE:  03/01/2019



SUBJECTIVE:  The patient feels well.  No complaints except for soreness in her knee,

but is cooperating with therapy. 



OBJECTIVE:  VITAL SIGNS:  Show temperature is 98.8, pulse 89, respirations 20, O2

sats 96% on room air, and blood pressure 120/58. 

LUNGS:  Clear. 

CARDIAC:  Regular rhythm. 

EXTREMITIES:  Left knee shows healing incision with some swelling with no erythema

or warmth. 



ASSESSMENT:  

1. Resolving left total knee replacement.

2. Stable diabetes, controlled to goal.

3. Hypertension, controlled to goal.



PLAN:  Continue PT and OT.  Continue Accu-Cheks to monitor and titrate and control

diabetes.  Continue DVT and stress ulcer prophylaxis. 







Job ID:  [id number]

## 2019-03-02 NOTE — PRG
DATE OF SERVICE:  03/02/2019



SUBJECTIVE:  The patient feels well and is sitting up in chair, watching TV.  Did

not have therapy today, but feels well with no complaints of pain or stiffness in

her knee. 



OBJECTIVE:  VITAL SIGNS:  Show temperature 99.2, pulse 85, respirations 20, O2 sats

95% on room air, and blood pressure 141/64.  Accu-Cheks ranged from 127 to 114. 

EXTREMITIES:  Left knee is swollen, but not tender.  No erythema or warmth.



ASSESSMENT:  

1. Resolving left total knee replacement.

2. Type 2 diabetes, controlled to goal.

3. Hypertension, controlled to goal.



PLAN:  Continue PT and OT.  Continue pain relief as needed.  Continue Accu-Cheks to

monitor and titrate and control diabetes.  Continue DVT and stress ulcer

prophylaxis. 







Job ID:  [id number]

## 2019-03-03 RX ADMIN — ASPIRIN SCH MG: 81 TABLET ORAL at 08:48

## 2019-03-03 RX ADMIN — ASPIRIN SCH MG: 81 TABLET ORAL at 20:49

## 2019-03-03 NOTE — PRG
DATE OF SERVICE:  03/03/2019



SUBJECTIVE:  The patient feels well.  No complaints except for some mild

constipation.  Lying in bed, waiting for breakfast. 



OBJECTIVE:  VITAL SIGNS:  Blood pressure is 110/66, pulse 89, temperature 98.2, O2

sats 96% on room air. 

LUNGS:  Clear. 

CARDIAC:  Regular rhythm. 

ABDOMEN:  Soft, nontender. 

SKIN/EXTREMITIES:  Display healing left total knee with persistent swelling, but no

erythema, warmth, and increasing range of motion. 



ASSESSMENT:  

1. Resolving left total knee replacement.

2. Mild constipation.

3. Stable type 2 diabetes.

4. Hypertension, controlled to goal.



PLAN:  

1. Continue Accu-Cheks to monitor diabetic control.

2. Continue Colace 100 twice daily for constipation.

3. Continue PT/OT.

4. Continue pain relief as needed.

5. Continue DVT and stress ulcer prophylaxis.







Job ID:  [id number]

## 2019-03-04 RX ADMIN — ASPIRIN SCH MG: 81 TABLET ORAL at 09:42

## 2019-03-04 RX ADMIN — ASPIRIN SCH MG: 81 TABLET ORAL at 20:22

## 2019-03-04 NOTE — PRG
DATE OF SERVICE:  03/04/2019



SUBJECTIVE:  Ms. Green is doing well.  Denies any complaints, resting comfortably,

tolerating her therapy.  Pain is well controlled. 



OBJECTIVE:  VITAL SIGNS:  She is afebrile, heart rate 80, respirations 18, oxygen

saturation 100% on room air, blood pressure 127/60. 

CARDIOVASCULAR:  S1 and S2 plus. 

RESPIRATORY:  Normal vesicular breath sounds. 

ABDOMEN:  Soft, obese, nontender.  Bowel sounds heard in all quadrants. 

EXTREMITIES:  Without cyanosis or clubbing.  Left knee incision is healthy. 

CENTRAL NERVOUS SYSTEM:  Alert, awake, and oriented x3.  Cranial nerves 2 through 12

intact. 



LABORATORY DATA:  Blood sugars are 115, 151, 115, and 152.



IMPRESSION:  

1. Status post left total knee replacement for osteoarthritis.

2. Diabetes mellitus type 2.

3. Hypertension.

4. Dyslipidemia.

5. Chronic low back pain.



PLAN:  

1. Continue current medications.

2. 1800-calorie heart healthy ADA diet.

3. DVT and stress ulcer prophylaxis.

4. Decubitus precautions.

5. Incision care.

6. Orthopedic precautions.

7. Accu-Cheks with sliding scale coverage.

8. Continue therapy.

9. Routine laboratory values.





Job ID:  [id number]

## 2019-03-05 RX ADMIN — ASPIRIN SCH MG: 81 TABLET ORAL at 09:36

## 2019-03-05 RX ADMIN — ASPIRIN SCH MG: 81 TABLET ORAL at 20:17

## 2019-03-06 RX ADMIN — ASPIRIN SCH MG: 81 TABLET ORAL at 20:35

## 2019-03-06 RX ADMIN — ASPIRIN SCH MG: 81 TABLET ORAL at 08:32

## 2019-03-06 NOTE — PRG
DATE OF SERVICE:  03/06/2019



SUBJECTIVE:  Ms. Green is doing well.  Denies any complaints.  Resting comfortably,

tolerating her therapy, and is much improved.  Discussed with Therapy and they

anticipate that she will be ready to go home on Friday. 



OBJECTIVE:  VITAL SIGNS:  She is afebrile.  Heart rate is 80, respirations 20,

oxygen saturation 96% on room air, blood pressure 131/51. 

CARDIOVASCULAR:  S1, S2 plus. 

RESPIRATORY:  Normal vesicular breath sounds. 

ABDOMEN:  Soft, obese, nontender.  Bowel sounds heard in all quadrants. 

EXTREMITIES:  Without cyanosis, clubbing.  Left knee incision is healthy. 

CENTRAL NERVOUS SYSTEM:  A and O x3.  Cranial nerves 2 through 12 grossly intact.

Grossly nonfocal. 



LABORATORY DATA:  Blood sugars are 155, 108, 175, 129, and 136.



IMPRESSION:  

1. Status post elective left total knee replacement.

2. Hypertension.

3. Diabetes mellitus type 2.

4. Dyslipidemia.

5. Obesity.

6. Chronic low back pain.



PLAN:  

1. Continue current medications.

2. 1800-calorie heart healthy ADA diet.

3. Accu-Cheks, sliding scale coverage.

4. Monitor blood pressure and adjust medications as needed.

5. Incision care.

6. Orthopedic precautions.

7. DVT and stress ulcer prophylaxis.

8. Decubitus precautions.

9. Routine laboratory values.

10. Discharge planning.







Job ID:  [id number]

## 2019-03-07 RX ADMIN — ASPIRIN SCH MG: 81 TABLET ORAL at 20:41

## 2019-03-07 RX ADMIN — HYDROCODONE BITARTRATE AND ACETAMINOPHEN PRN TAB: 5; 325 TABLET ORAL at 08:33

## 2019-03-07 RX ADMIN — ASPIRIN SCH MG: 81 TABLET ORAL at 08:33

## 2019-03-07 NOTE — PRG
DATE OF SERVICE:  03/07/2019



SUBJECTIVE:  Ms. Green is doing well.  Denies any complaints, resting comfortably,

improved enough that therapy feels like she is ready to go home tomorrow.  Discharge

med list has been done.  We will have her go home on tramadol instead of the

hydrocodone and I will send the tramadol in for her from my office.  Home Health

with a walker to be arranged by Case Management. 



OBJECTIVE:  VITAL SIGNS:  She is afebrile.  Heart rate is 77, respirations 20,

oxygen saturation 94% on room air, and blood pressure 130/62. 

CARDIOVASCULAR SYSTEM:  S1 and S2 plus. 

RESPIRATORY SYSTEM:  Normal vesicular breath sounds. 

ABDOMEN:  Soft, obese, and nontender.  Bowel sounds heard in all quadrants. 

EXTREMITIES:  Without cyanosis, clubbing.  Trace edema, left leg.  Left knee

incision is healthy. 

NERVOUS SYSTEM:  A and O x3.  Cranial nerves 2 through 12 intact.



LABORATORY DATA:  Blood sugars are 132, 151, 114, and 139.



IMPRESSION:  

1. Osteoarthritis, status post elective left total knee replacement.

2. Hypertension.

3. Dyslipidemia.

4. Diabetes mellitus type 2.

5. Chronic low back pain.

6. Obesity.



PLAN:  

1. Continue current medications.

2. 1800-calorie heart healthy ADA diet.

3. Accu-Cheks with sliding scale coverage.

4. DVT and stress ulcer prophylaxis.

5. Decubitus precautions.

6. Routine laboratory values.

7. Incision care.

8. Anticipate discharging her home tomorrow with home health and a walker.  All

discharge medications were done.  We will send in her prescription into her pharmacy

for the tramadol.  No family at bedside.  Discussed with nurse. 







Job ID:  [id number]

## 2019-03-08 VITALS — TEMPERATURE: 98.4 F

## 2019-03-08 VITALS — SYSTOLIC BLOOD PRESSURE: 147 MMHG | DIASTOLIC BLOOD PRESSURE: 65 MMHG

## 2019-03-08 RX ADMIN — ASPIRIN SCH MG: 81 TABLET ORAL at 09:27

## 2020-01-14 ENCOUNTER — HOSPITAL ENCOUNTER (OUTPATIENT)
Dept: HOSPITAL 18 - NAV RAD | Age: 75
Discharge: HOME | End: 2020-01-14
Attending: INTERNAL MEDICINE
Payer: MEDICARE

## 2020-01-14 DIAGNOSIS — M11.242: Primary | ICD-10-CM

## 2020-01-14 NOTE — RAD
EXAM: 3 views of the left hand



COMPARISON: None



HISTORY: Gout with left hand pain



FINDINGS: 3 views of the hand shows no evidence of acute fracture or dislocation. Degenerative change
s are seen along the radial aspect of the wrist with erosions seen in the distal radius, scaphoid

and trapezium. Radial soft tissue swelling is present.



IMPRESSION: Erosions along the radial aspect of the wrist may be secondary to gout.



Reported By: Srikanth Michele 

Electronically Signed:  1/14/2020 12:03 PM

## 2023-03-10 ENCOUNTER — HOSPITAL ENCOUNTER (EMERGENCY)
Dept: HOSPITAL 18 - NAV ERS | Age: 78
Discharge: HOME | End: 2023-03-10
Payer: MEDICARE

## 2023-03-10 DIAGNOSIS — J06.9: Primary | ICD-10-CM

## 2023-03-10 DIAGNOSIS — E78.5: ICD-10-CM

## 2023-03-10 DIAGNOSIS — I10: ICD-10-CM

## 2023-03-10 PROCEDURE — 99283 EMERGENCY DEPT VISIT LOW MDM: CPT

## 2023-03-10 PROCEDURE — 87081 CULTURE SCREEN ONLY: CPT

## 2023-03-10 PROCEDURE — 87430 STREP A AG IA: CPT

## 2023-03-10 PROCEDURE — U0005 INFEC AGEN DETEC AMPLI PROBE: HCPCS

## 2023-03-10 PROCEDURE — U0003 INFECTIOUS AGENT DETECTION BY NUCLEIC ACID (DNA OR RNA); SEVERE ACUTE RESPIRATORY SYNDROME CORONAVIRUS 2 (SARS-COV-2) (CORONAVIRUS DISEASE [COVID-19]), AMPLIFIED PROBE TECHNIQUE, MAKING USE OF HIGH THROUGHPUT TECHNOLOGIES AS DESCRIBED BY CMS-2020-01-R: HCPCS

## 2024-08-06 ENCOUNTER — HOSPITAL ENCOUNTER (EMERGENCY)
Dept: HOSPITAL 18 - NAV ERS | Age: 79
Discharge: HOME | End: 2024-08-06
Payer: MEDICARE

## 2024-08-06 DIAGNOSIS — M19.012: Primary | ICD-10-CM

## 2024-08-06 DIAGNOSIS — E78.5: ICD-10-CM

## 2024-08-06 DIAGNOSIS — I10: ICD-10-CM

## 2024-08-06 DIAGNOSIS — Z79.899: ICD-10-CM
